# Patient Record
(demographics unavailable — no encounter records)

---

## 2025-07-12 NOTE — PHYSICAL EXAM
[Alert] : alert [Oriented x 3] : ~L oriented x 3 [Well Nourished] : well nourished [Conjunctiva Non-injected] : conjunctiva non-injected [No Visual Lymphadenopathy] : no visual  lymphadenopathy [No Clubbing] : no clubbing [No Edema] : no edema [No Bromhidrosis] : no bromhidrosis [No Chromhidrosis] : no chromhidrosis [Declined] : declined [FreeTextEntry3] : Focused skin exam performed today relevant portions documented as below:  stuck on brown plaques on left forearm and face gritty papules on nose and L temple and cheek

## 2025-07-12 NOTE — ASSESSMENT
[FreeTextEntry1] : # Seborrheic keratoses - Benign, counseled no treatment warranted   #Actinic Keratoses x7, nose, L temple, L cheek - Patient was verbally consented and the lesions identified above were treated with liquid nitrogen freeze, thaw, freeze x 10 seconds each cycle x 2. Side effects include blister formation, hypopigmentation, and scarring. The patient tolerated the procedure well. Wound care instructions, care of a blister with vaseline, signs and symptoms of infection were discussed in full. The patient denies any questions at this time.  RTC prn

## 2025-07-12 NOTE — HISTORY OF PRESENT ILLNESS
[FreeTextEntry1] : fu rough spots [de-identified] : 95M with a hx of nBCC on the R inferior eyelid in 2018 and actinic keratoses, s/p Efudex BID x 2 weeks to b/l forehead w/ response here for f/u of rough spots on face, asymptomatic.  SHx: enjoys fishing, consistently wears hats

## 2025-07-12 NOTE — HISTORY OF PRESENT ILLNESS
[FreeTextEntry1] : fu rough spots [de-identified] : 95M with a hx of nBCC on the R inferior eyelid in 2018 and actinic keratoses, s/p Efudex BID x 2 weeks to b/l forehead w/ response here for f/u of rough spots on face, asymptomatic.  SHx: enjoys fishing, consistently wears hats

## 2025-07-12 NOTE — HISTORY OF PRESENT ILLNESS
[FreeTextEntry1] : fu rough spots [de-identified] : 95M with a hx of nBCC on the R inferior eyelid in 2018 and actinic keratoses, s/p Efudex BID x 2 weeks to b/l forehead w/ response here for f/u of rough spots on face, asymptomatic.  SHx: enjoys fishing, consistently wears hats